# Patient Record
Sex: FEMALE | Race: BLACK OR AFRICAN AMERICAN | NOT HISPANIC OR LATINO | URBAN - METROPOLITAN AREA
[De-identification: names, ages, dates, MRNs, and addresses within clinical notes are randomized per-mention and may not be internally consistent; named-entity substitution may affect disease eponyms.]

---

## 2021-05-01 ENCOUNTER — OFFICE (OUTPATIENT)
Dept: URBAN - METROPOLITAN AREA CLINIC 106 | Facility: CLINIC | Age: 66
End: 2021-05-01

## 2023-05-23 ENCOUNTER — OFFICE (OUTPATIENT)
Dept: URBAN - METROPOLITAN AREA CLINIC 67 | Facility: CLINIC | Age: 68
End: 2023-05-23

## 2023-05-23 VITALS
DIASTOLIC BLOOD PRESSURE: 70 MMHG | DIASTOLIC BLOOD PRESSURE: 110 MMHG | WEIGHT: 205 LBS | SYSTOLIC BLOOD PRESSURE: 178 MMHG | HEIGHT: 68 IN | SYSTOLIC BLOOD PRESSURE: 150 MMHG

## 2023-05-23 DIAGNOSIS — R14.0 ABDOMINAL DISTENSION (GASEOUS): ICD-10-CM

## 2023-05-23 DIAGNOSIS — Z86.010 PERSONAL HISTORY OF COLONIC POLYPS: ICD-10-CM

## 2023-05-23 DIAGNOSIS — Z86.2 PERSONAL HISTORY OF DISEASES OF THE BLOOD AND BLOOD-FORMING: ICD-10-CM

## 2023-05-23 DIAGNOSIS — R10.10 UPPER ABDOMINAL PAIN, UNSPECIFIED: ICD-10-CM

## 2023-05-23 PROCEDURE — 99214 OFFICE O/P EST MOD 30 MIN: CPT | Performed by: INTERNAL MEDICINE

## 2023-05-23 NOTE — SERVICEHPINOTES
Queen Sanchez   is seen today for a follow-up visit.     The patient is seen today, after a lengthy absence, for follow-up regarding chronic abdominal discomfort, history of GERD and a personal history of colon polyps. Her most recent colonoscopy done in 11/2020 (secondary to lower abdominal discomfort, hematochezia and a change in bowel habits) was remarkable for a small cecal tubular adenoma, sigmoid diverticulosis and mild internal hemorrhoids. brAn EGD done at the same time (due to epigastric abdominal discomfort and GERD) was unremarkable.
rubens art Today, she reports that she underwent a parathyroidectomy in 2/2023 ([place], [place]) and since then she has been experiencing episodes of abdominal cramping and bloating along with occasional dysphagia-like symptoms for certain solids that she localizes to her upper esophagus. She denies any GI tract bleeding symptoms or unexplained weight loss. rubens

## 2023-05-23 NOTE — SERVICENOTES
I will start with the above blood work and esophagram - in the meantime, I will put her on a trial of Dicyclomine.

## 2023-12-19 ENCOUNTER — OFFICE (OUTPATIENT)
Dept: URBAN - METROPOLITAN AREA CLINIC 67 | Facility: CLINIC | Age: 68
End: 2023-12-19

## 2023-12-19 VITALS
RESPIRATION RATE: 14 BRPM | SYSTOLIC BLOOD PRESSURE: 132 MMHG | DIASTOLIC BLOOD PRESSURE: 84 MMHG | HEART RATE: 95 BPM | WEIGHT: 207 LBS | HEIGHT: 68 IN

## 2023-12-19 DIAGNOSIS — R10.10 UPPER ABDOMINAL PAIN, UNSPECIFIED: ICD-10-CM

## 2023-12-19 PROCEDURE — 99214 OFFICE O/P EST MOD 30 MIN: CPT | Performed by: INTERNAL MEDICINE

## 2023-12-19 NOTE — SERVICEHPINOTES
Queen Sanchez   is seen today for a follow-up visit   regarding chronic abdominal discomfort, history of GERD, a history of dysphagia and a personal history of colon polyps.Her most recent colonoscopy done in 11/2020 (secondary to lower abdominal discomfort, hematochezia and a change in bowel habits) was remarkable for a small cecal tubular adenoma, sigmoid diverticulosis and mild internal hemorrhoids.brAn EGD done at the same time (due to epigastric abdominal discomfort and GERD) was unremarkable.At the time of her last appointment in 5/2023, blood work demonstrated a normal CBC, liver enzymes, CRP and tTG IgA Ab titer. brShe also underwent a barium esophagram (due to dysphagia symptoms following a parathyroidectomy in 2/2023) which was remarkable for a small hiatal hernia and associated reflux. There was no evidence of dysmotility and no stricture as the 13mm barium tablet passed into the stomach. I also placed her on a trial of Dicyclomine and today she reports that still has episodes of epigastric discomfort but also has been experiencing discomfort in her lower abdomen as well. She denies any GI tract bleeding symptoms, emesis or unexplained weight loss - she has not been taking the Dicyclomine as she actually never picked up the prescription. br

## 2023-12-19 NOTE — SERVICENOTES
I will put her back on the trial of Dicyclomine and will also check a CT scan given her ongoing abdominal discomfort symptoms.

## 2024-02-27 ENCOUNTER — OFFICE (OUTPATIENT)
Dept: URBAN - METROPOLITAN AREA CLINIC 67 | Facility: CLINIC | Age: 69
End: 2024-02-27
Payer: COMMERCIAL

## 2024-02-27 VITALS
DIASTOLIC BLOOD PRESSURE: 80 MMHG | HEIGHT: 68 IN | WEIGHT: 207 LBS | SYSTOLIC BLOOD PRESSURE: 120 MMHG | HEART RATE: 101 BPM

## 2024-02-27 DIAGNOSIS — Z86.010 PERSONAL HISTORY OF COLONIC POLYPS: ICD-10-CM

## 2024-02-27 DIAGNOSIS — K58.1 IRRITABLE BOWEL SYNDROME WITH CONSTIPATION: ICD-10-CM

## 2024-02-27 PROCEDURE — 99214 OFFICE O/P EST MOD 30 MIN: CPT | Performed by: INTERNAL MEDICINE

## 2024-02-27 NOTE — SERVICEHPINOTES
Queen Sanchez   is seen today for a follow-up visit   regarding chronic abdominal discomfort, history of GERD, a history of dysphagia and a personal history of colon polyps.Her most recent colonoscopy done in 11/2020 (secondary to lower abdominal discomfort, hematochezia and a change in bowel habits) was remarkable for a small cecal tubular adenoma, sigmoid diverticulosis and mild internal hemorrhoids.brAn EGD done at the same time (due to epigastric abdominal discomfort and GERD) was unremarkable.At the time of a follow-up appointment in 5/2023, blood work demonstrated a normal CBC, liver enzymes, CRP and tTG IgA Ab titer.brShe also underwent a barium esophagram on 5/30/23 (due to dysphagia symptoms following a parathyroidectomy in 2/2023) which was remarkable for a small hiatal hernia and associated reflux. There was no evidence of dysmotility and no stricture as the 13mm barium tablet passed into the stomach.In the interim since her last appointment, she underwent a contrasted CT of the abd/pelvis on 12/28/23 (secondary to ongoing abdominal discomfort) which was remarkable for a simple right renal cyst, s/p hysterectomy and moderate colonic stool. brToday, she reports that she has had good improvement in her abdominal discomfort and constipation with the OTC Miralax and Dicyclomine as needed. No new issues/complaints to voice today. br

## 2024-02-27 NOTE — SERVICENOTES
She has responded well to a regimen of daily OTC Miralax and Dicyclomine as needed. I will have her stay on this combination and have her return for follow-up in 1 year or sooner if needed.

## 2024-12-20 ENCOUNTER — OFFICE (OUTPATIENT)
Dept: URBAN - METROPOLITAN AREA PATHOLOGY 10 | Facility: PATHOLOGY | Age: 69
End: 2024-12-20
Payer: MEDICARE

## 2024-12-20 ENCOUNTER — AMBULATORY SURGICAL CENTER (OUTPATIENT)
Dept: URBAN - METROPOLITAN AREA SURGERY 19 | Facility: SURGERY | Age: 69
End: 2024-12-20
Payer: MEDICARE

## 2024-12-20 DIAGNOSIS — K57.30 DIVERTICULOSIS OF LARGE INTESTINE WITHOUT PERFORATION OR ABS: ICD-10-CM

## 2024-12-20 DIAGNOSIS — K29.30 CHRONIC SUPERFICIAL GASTRITIS WITHOUT BLEEDING: ICD-10-CM

## 2024-12-20 DIAGNOSIS — R10.13 EPIGASTRIC PAIN: ICD-10-CM

## 2024-12-20 DIAGNOSIS — R10.30 LOWER ABDOMINAL PAIN, UNSPECIFIED: ICD-10-CM

## 2024-12-20 DIAGNOSIS — B96.81 HELICOBACTER PYLORI [H. PYLORI] AS THE CAUSE OF DISEASES CLA: ICD-10-CM

## 2024-12-20 DIAGNOSIS — K64.0 FIRST DEGREE HEMORRHOIDS: ICD-10-CM

## 2024-12-20 PROCEDURE — 88342 IMHCHEM/IMCYTCHM 1ST ANTB: CPT | Performed by: PATHOLOGY

## 2024-12-20 PROCEDURE — 88313 SPECIAL STAINS GROUP 2: CPT | Performed by: PATHOLOGY

## 2024-12-20 PROCEDURE — 88305 TISSUE EXAM BY PATHOLOGIST: CPT | Performed by: PATHOLOGY

## 2024-12-20 PROCEDURE — 45378 DIAGNOSTIC COLONOSCOPY: CPT | Performed by: INTERNAL MEDICINE

## 2024-12-20 PROCEDURE — 43239 EGD BIOPSY SINGLE/MULTIPLE: CPT | Mod: 51 | Performed by: INTERNAL MEDICINE

## 2025-03-19 ENCOUNTER — OFFICE (OUTPATIENT)
Dept: URBAN - METROPOLITAN AREA CLINIC 67 | Facility: CLINIC | Age: 70
End: 2025-03-19
Payer: COMMERCIAL

## 2025-03-19 VITALS
OXYGEN SATURATION: 98 % | HEART RATE: 82 BPM | SYSTOLIC BLOOD PRESSURE: 130 MMHG | DIASTOLIC BLOOD PRESSURE: 78 MMHG | HEIGHT: 68 IN | WEIGHT: 196 LBS

## 2025-03-19 DIAGNOSIS — B96.81 HELICOBACTER PYLORI [H. PYLORI] AS THE CAUSE OF DISEASES CLA: ICD-10-CM

## 2025-03-19 DIAGNOSIS — R13.19 OTHER DYSPHAGIA: ICD-10-CM

## 2025-03-19 PROCEDURE — 99213 OFFICE O/P EST LOW 20 MIN: CPT

## 2025-03-19 RX ORDER — SUCRALFATE 1 G/1
TABLET ORAL
Qty: 20 | Refills: 0 | Status: ACTIVE
Start: 2025-03-19

## 2025-03-19 RX ORDER — FAMOTIDINE 20 MG/1
TABLET, FILM COATED ORAL
Qty: 90 | Refills: 0 | Status: ACTIVE